# Patient Record
Sex: FEMALE | Race: AMERICAN INDIAN OR ALASKA NATIVE | URBAN - METROPOLITAN AREA
[De-identification: names, ages, dates, MRNs, and addresses within clinical notes are randomized per-mention and may not be internally consistent; named-entity substitution may affect disease eponyms.]

---

## 2022-12-15 ENCOUNTER — OFFICE VISIT (OUTPATIENT)
Dept: URBAN - METROPOLITAN AREA CLINIC 46 | Facility: CLINIC | Age: 72
End: 2022-12-15
Payer: MEDICARE

## 2022-12-15 DIAGNOSIS — H25.13 AGE-RELATED NUCLEAR CATARACT, BILATERAL: Primary | ICD-10-CM

## 2022-12-15 DIAGNOSIS — H52.223 REGULAR ASTIGMATISM, BILATERAL: ICD-10-CM

## 2022-12-15 PROCEDURE — 99204 OFFICE O/P NEW MOD 45 MIN: CPT | Performed by: OPHTHALMOLOGY

## 2022-12-15 PROCEDURE — 92136 OPHTHALMIC BIOMETRY: CPT | Performed by: OPHTHALMOLOGY

## 2022-12-15 ASSESSMENT — INTRAOCULAR PRESSURE
OS: 18
OD: 20

## 2022-12-15 ASSESSMENT — VISUAL ACUITY
OS: 20/60
OD: 20/60

## 2022-12-15 NOTE — IMPRESSION/PLAN
Impression: Regular astigmatism, bilateral: H52.223. Plan: Discussed with patient what astigmatism is and how it is treated. A Further explained that with such a lens there is a 90% chance of achieving 20/40 or better uncorrected distance vision (assuming the lens is targeted for distance, similar for near target). They seem to understand.

## 2023-02-07 ENCOUNTER — PROCEDURE (OUTPATIENT)
Dept: URBAN - METROPOLITAN AREA SURGERY 25 | Facility: SURGERY | Age: 73
End: 2023-02-07
Payer: MEDICARE

## 2023-02-07 DIAGNOSIS — H25.13 AGE-RELATED NUCLEAR CATARACT, BILATERAL: Primary | ICD-10-CM

## 2023-02-07 PROCEDURE — 66984 XCAPSL CTRC RMVL W/O ECP: CPT | Performed by: OPHTHALMOLOGY

## 2023-02-07 PROCEDURE — PR3CP PR3CP: CUSTOM | Performed by: OPHTHALMOLOGY

## 2023-02-08 ENCOUNTER — POST-OPERATIVE VISIT (OUTPATIENT)
Dept: URBAN - METROPOLITAN AREA CLINIC 50 | Facility: CLINIC | Age: 73
End: 2023-02-08
Payer: MEDICARE

## 2023-02-08 DIAGNOSIS — Z48.810 ENCOUNTER FOR SURGICAL AFTERCARE FOLLOWING SURGERY ON A SENSE ORGAN: Primary | ICD-10-CM

## 2023-02-08 PROCEDURE — 99024 POSTOP FOLLOW-UP VISIT: CPT | Performed by: OPHTHALMOLOGY

## 2023-02-08 ASSESSMENT — INTRAOCULAR PRESSURE: OS: 18

## 2023-02-15 ENCOUNTER — OFFICE VISIT (OUTPATIENT)
Dept: URBAN - METROPOLITAN AREA CLINIC 46 | Facility: CLINIC | Age: 73
End: 2023-02-15
Payer: MEDICARE

## 2023-02-15 DIAGNOSIS — H25.11 AGE-RELATED NUCLEAR CATARACT, RIGHT EYE: Primary | ICD-10-CM

## 2023-02-15 PROCEDURE — 99213 OFFICE O/P EST LOW 20 MIN: CPT | Performed by: OPHTHALMOLOGY

## 2023-02-15 ASSESSMENT — INTRAOCULAR PRESSURE
OD: 14
OS: 14

## 2023-02-15 NOTE — IMPRESSION/PLAN
Impression: Age-related nuclear cataract, right eye: H25.11. Plan: Patient advised there is a cataract, and it is affecting their visual functioning. They may proceed with surgery. They were also advised that having cataract surgery does not mean they will not need to use glasses or contact lenses. Reviewed cataract surgery options with patient. Patient is a candidate for LensX, ORA, or upgraded lens. 

[Cat sx OD 2nd eye standard lens and ORA, Aim -2.50; tx w/ dextenza]

## 2023-02-27 ENCOUNTER — OFFICE VISIT (OUTPATIENT)
Dept: URBAN - METROPOLITAN AREA CLINIC 50 | Facility: CLINIC | Age: 73
End: 2023-02-27
Payer: MEDICARE

## 2023-02-27 DIAGNOSIS — H43.393 OTHER VITREOUS OPACITIES, BILATERAL: Primary | ICD-10-CM

## 2023-02-27 DIAGNOSIS — H25.11 AGE-RELATED NUCLEAR CATARACT, RIGHT EYE: ICD-10-CM

## 2023-02-27 PROCEDURE — 99214 OFFICE O/P EST MOD 30 MIN: CPT | Performed by: OPHTHALMOLOGY

## 2023-02-27 ASSESSMENT — INTRAOCULAR PRESSURE
OD: 15
OS: 15

## 2023-02-27 NOTE — IMPRESSION/PLAN
Impression: Age-related nuclear cataract, right eye: H25.11. Plan: Follow up as scheduled for surgery.

## 2023-02-28 ENCOUNTER — PROCEDURE (OUTPATIENT)
Dept: URBAN - METROPOLITAN AREA SURGERY 25 | Facility: SURGERY | Age: 73
End: 2023-02-28
Payer: MEDICARE

## 2023-02-28 PROCEDURE — PR3CP PR3CP: CUSTOM | Performed by: OPHTHALMOLOGY

## 2023-02-28 PROCEDURE — 66984 XCAPSL CTRC RMVL W/O ECP: CPT | Performed by: OPHTHALMOLOGY

## 2023-03-01 ENCOUNTER — POST-OPERATIVE VISIT (OUTPATIENT)
Dept: URBAN - METROPOLITAN AREA CLINIC 46 | Facility: CLINIC | Age: 73
End: 2023-03-01
Payer: MEDICARE

## 2023-03-01 DIAGNOSIS — Z48.810 ENCOUNTER FOR SURGICAL AFTERCARE FOLLOWING SURGERY ON A SENSE ORGAN: Primary | ICD-10-CM

## 2023-03-01 PROCEDURE — 99024 POSTOP FOLLOW-UP VISIT: CPT | Performed by: OPHTHALMOLOGY

## 2023-03-01 ASSESSMENT — INTRAOCULAR PRESSURE: OD: 14

## 2023-03-01 NOTE — IMPRESSION/PLAN
Impression: S/P Cataract Extraction by phacoemulsification with IOL placement; ORA; 63631: Insert of drug-eluting implant including punct dil OD - 1 Day. Encounter for surgical aftercare following surgery on a sense organ  Z48.810. Excellent post op course   Post operative instructions reviewed - Condition is improving - Plan: Doing well, continue to observe, call if any new problems.

## 2023-03-30 ENCOUNTER — POST-OPERATIVE VISIT (OUTPATIENT)
Dept: URBAN - METROPOLITAN AREA CLINIC 50 | Facility: CLINIC | Age: 73
End: 2023-03-30
Payer: MEDICARE

## 2023-03-30 DIAGNOSIS — H52.13 MYOPIA, BILATERAL: Primary | ICD-10-CM

## 2023-03-30 DIAGNOSIS — Z96.1 PRESENCE OF INTRAOCULAR LENS: ICD-10-CM

## 2023-03-30 PROCEDURE — 99024 POSTOP FOLLOW-UP VISIT: CPT | Performed by: OPTOMETRIST

## 2023-03-30 ASSESSMENT — VISUAL ACUITY
OS: 20/20
OD: 20/20

## 2023-03-30 ASSESSMENT — INTRAOCULAR PRESSURE
OD: 18
OS: 18

## 2023-03-30 NOTE — IMPRESSION/PLAN
Impression: Presence of intraocular lens  Z96.1. Excellent post op course. Post operative instructions reviewed - Condition is improving. Plan: Lens Clear & positioned well. Call if any sudden changes occur. --Advised patient to use artificial tears for comfort as needed. Warm compresses also recommended twice daily.

## 2024-03-27 ENCOUNTER — OFFICE VISIT (OUTPATIENT)
Dept: URBAN - METROPOLITAN AREA CLINIC 50 | Facility: CLINIC | Age: 74
End: 2024-03-27
Payer: MEDICARE

## 2024-03-27 DIAGNOSIS — H43.811 VITREOUS DEGENERATION, RIGHT EYE: Primary | ICD-10-CM

## 2024-03-27 DIAGNOSIS — H52.4 PRESBYOPIA: ICD-10-CM

## 2024-03-27 DIAGNOSIS — Z96.1 PRESENCE OF INTRAOCULAR LENS: ICD-10-CM

## 2024-03-27 DIAGNOSIS — H16.223 KERATOCONJUNCTIVITIS SICCA, BILATERAL: ICD-10-CM

## 2024-03-27 DIAGNOSIS — H16.143 PUNCTATE KERATITIS, BILATERAL: ICD-10-CM

## 2024-03-27 PROCEDURE — 99204 OFFICE O/P NEW MOD 45 MIN: CPT | Performed by: OPTOMETRIST

## 2024-03-27 ASSESSMENT — VISUAL ACUITY
OD: 20/20
OS: 20/25

## 2024-03-27 ASSESSMENT — INTRAOCULAR PRESSURE
OS: 15
OD: 20

## 2024-04-10 ENCOUNTER — OFFICE VISIT (OUTPATIENT)
Dept: URBAN - METROPOLITAN AREA CLINIC 50 | Facility: CLINIC | Age: 74
End: 2024-04-10
Payer: MEDICARE

## 2024-04-10 DIAGNOSIS — H52.4 PRESBYOPIA: Primary | ICD-10-CM

## 2024-04-10 PROCEDURE — 92015 DETERMINE REFRACTIVE STATE: CPT | Performed by: OPTOMETRIST

## 2024-04-10 ASSESSMENT — VISUAL ACUITY
OD: 20/25
OS: 20/25